# Patient Record
Sex: FEMALE | Race: WHITE | NOT HISPANIC OR LATINO | URBAN - METROPOLITAN AREA
[De-identification: names, ages, dates, MRNs, and addresses within clinical notes are randomized per-mention and may not be internally consistent; named-entity substitution may affect disease eponyms.]

---

## 2018-11-15 ENCOUNTER — APPOINTMENT (OUTPATIENT)
Age: 58
Setting detail: DERMATOLOGY
End: 2018-11-15

## 2018-11-15 VITALS — DIASTOLIC BLOOD PRESSURE: 90 MMHG | SYSTOLIC BLOOD PRESSURE: 132 MMHG | HEART RATE: 77 BPM

## 2018-11-15 VITALS
SYSTOLIC BLOOD PRESSURE: 146 MMHG | DIASTOLIC BLOOD PRESSURE: 100 MMHG | HEART RATE: 84 BPM | HEIGHT: 66 IN | WEIGHT: 170 LBS

## 2018-11-15 PROBLEM — C44.219 BASAL CELL CARCINOMA OF SKIN OF LEFT EAR AND EXTERNAL AURICULAR CANAL: Status: ACTIVE | Noted: 2018-11-15

## 2018-11-15 PROCEDURE — OTHER CONSULTATION FOR MOHS SURGERY: OTHER

## 2018-11-15 PROCEDURE — OTHER PATIENT SPECIFIC COUNSELING: OTHER

## 2018-11-15 PROCEDURE — 17312 MOHS ADDL STAGE: CPT

## 2018-11-15 PROCEDURE — 17311 MOHS 1 STAGE H/N/HF/G: CPT

## 2018-11-15 PROCEDURE — OTHER MOHS SURGERY: OTHER

## 2018-11-15 NOTE — PROCEDURE: CONSULTATION FOR MOHS SURGERY
Location Indication Override (Is Already Calculated Based On Selected Body Location): Area H
Incorporate Mauc In Note: Yes
Detail Level: Detailed
Size Of Lesion: 3
Name Of The Referring Provider For Procedure: Dr. Aguirre
X Size Of Lesion In Cm (Optional): 1.8

## 2018-11-15 NOTE — PROCEDURE: PATIENT SPECIFIC COUNSELING
Due to size and location of defect, discussed repair options with the patient including a referral to an ENT surgeon. Explained risks and benefits of having wound repaired including healing times and activity restrictions. The patient will be referred to an local ENT or plastic surgeon today for consultation.
Detail Level: Simple

## 2018-12-19 ENCOUNTER — APPOINTMENT (OUTPATIENT)
Age: 58
Setting detail: DERMATOLOGY
End: 2018-12-22

## 2018-12-19 VITALS
SYSTOLIC BLOOD PRESSURE: 142 MMHG | WEIGHT: 170 LBS | HEIGHT: 65 IN | HEART RATE: 71 BPM | DIASTOLIC BLOOD PRESSURE: 92 MMHG

## 2018-12-19 DIAGNOSIS — Z48.817 ENCOUNTER FOR SURGICAL AFTERCARE FOLLOWING SURGERY ON THE SKIN AND SUBCUTANEOUS TISSUE: ICD-10-CM

## 2018-12-19 PROCEDURE — OTHER PATIENT SPECIFIC COUNSELING: OTHER

## 2018-12-19 PROCEDURE — OTHER COUNSELING: OTHER

## 2018-12-19 PROCEDURE — OTHER MIPS QUALITY: OTHER

## 2018-12-19 ASSESSMENT — LOCATION ZONE DERM
LOCATION ZONE: SCALP
LOCATION ZONE: EAR

## 2018-12-19 ASSESSMENT — LOCATION SIMPLE DESCRIPTION DERM
LOCATION SIMPLE: SCALP
LOCATION SIMPLE: LEFT EAR

## 2018-12-19 ASSESSMENT — LOCATION DETAILED DESCRIPTION DERM
LOCATION DETAILED: LEFT SUPERIOR HELIX
LOCATION DETAILED: LEFT CENTRAL POSTAURICULAR SKIN

## 2018-12-19 ASSESSMENT — PAIN INTENSITY VAS: HOW INTENSE IS YOUR PAIN 0 BEING NO PAIN, 10 BEING THE MOST SEVERE PAIN POSSIBLE?: NO PAIN

## 2018-12-19 NOTE — PROCEDURE: PATIENT SPECIFIC COUNSELING
Detail Level: Simple
Reassured patient that the wound is healing well with no evidence of infection or recurrence. Continue biannual skin examinations with Dr. Aguirre.